# Patient Record
Sex: MALE | Race: ASIAN | URBAN - METROPOLITAN AREA
[De-identification: names, ages, dates, MRNs, and addresses within clinical notes are randomized per-mention and may not be internally consistent; named-entity substitution may affect disease eponyms.]

---

## 2017-02-16 ENCOUNTER — RADIANT APPOINTMENT (OUTPATIENT)
Dept: GENERAL RADIOLOGY | Facility: CLINIC | Age: 37
End: 2017-02-16
Attending: FAMILY MEDICINE
Payer: MEDICAID

## 2017-02-16 ENCOUNTER — OFFICE VISIT (OUTPATIENT)
Dept: FAMILY MEDICINE | Facility: CLINIC | Age: 37
End: 2017-02-16
Payer: MEDICAID

## 2017-02-16 VITALS
SYSTOLIC BLOOD PRESSURE: 120 MMHG | WEIGHT: 180 LBS | TEMPERATURE: 96.1 F | OXYGEN SATURATION: 95 % | BODY MASS INDEX: 27.78 KG/M2 | DIASTOLIC BLOOD PRESSURE: 78 MMHG | HEART RATE: 84 BPM

## 2017-02-16 DIAGNOSIS — J06.9 UPPER RESPIRATORY TRACT INFECTION, UNSPECIFIED TYPE: ICD-10-CM

## 2017-02-16 DIAGNOSIS — R06.2 WHEEZING: Primary | ICD-10-CM

## 2017-02-16 PROCEDURE — 71020 XR CHEST 2 VW: CPT

## 2017-02-16 PROCEDURE — 99214 OFFICE O/P EST MOD 30 MIN: CPT | Performed by: FAMILY MEDICINE

## 2017-02-16 RX ORDER — PREDNISONE 20 MG/1
40 TABLET ORAL DAILY
Qty: 10 TABLET | Refills: 0 | Status: SHIPPED | OUTPATIENT
Start: 2017-02-16 | End: 2017-02-21

## 2017-02-16 RX ORDER — DEXTROMETHORPHAN 30 MG/5ML
SUSPENSION, EXTENDED RELEASE ORAL
COMMUNITY
Start: 2017-02-12

## 2017-02-16 NOTE — PROGRESS NOTES
HPI:    Domo Zhu is an 36 year old male who presents for evaluation of cold symptoms.    Duration: 5-6 days  Fever: no  Cough: Productive of non bloody sputum  Shortness of breath: yes along with wheezing  History of asthma: denies  Sore throat: no  Runny nose: yes  Nasal congestion: yes  Ear pain: no  Treatment:   He was seen at [facility] on 2/12/17 and rx'd Alb. He feels better temporarily with it.    ROS:    Per HPI    SH:    Non smoker    Exam:    /78 (Cuff Size: Adult Large)  Pulse 84  Temp 96.1  F (35.6  C) (Tympanic)  Wt 180 lb (81.6 kg)  SpO2 95%  BMI 27.78 kg/m2  Gen: Healthy appearing male in no acute distress  ENT: TM's normal. Oropharynx normal. Oral mucosa moist without lesions.  Eyes: Conjunctiva and sclera normal. Pupils react normally to light. No nystagmus.  Neck: No enlarged lymph nodes, thyromegally or other masses.  Lungs: Good air movement with exp wheezing and some rhonchi. No crackles. O2 sat noted. No tachypnea or other signs of resp distress.  CV: Heart RRR with no murmurs. No JVD, carotid bruits or leg edema.      Assessment and Plan - Decision Making    1. Wheezing  See written instructions.  - XR Chest 2 Views  - predniSONE (DELTASONE) 20 MG tablet; Take 2 tablets (40 mg) by mouth daily for 5 days  Dispense: 10 tablet; Refill: 0    2. Upper respiratory tract infection, unspecified type  See written instructions.  - XR Chest 2 Views      Written instructions given as follows:    Patient Instructions   1. You xray was normal.    2. You most likely have influenza. This will resolve with time. Take over the counter medications as needed.    3. Take Prednisone as prescribed. This is a steroid anti-inflammatory medication. When used long term it can have many health consequences. But short term use is safe - common side effects are insomnia and anxiety. It should be taken 1st part of the day.    4. Come back in the next few weeks for a physical - come fasting so we do blood  tests (nothing except water for 10-12 hours).

## 2017-02-16 NOTE — PATIENT INSTRUCTIONS
1. You xray was normal.    2. You most likely have influenza. This will resolve with time. Take over the counter medications as needed.    3. Take Prednisone as prescribed. This is a steroid anti-inflammatory medication. When used long term it can have many health consequences. But short term use is safe - common side effects are insomnia and anxiety. It should be taken 1st part of the day.    4. Come back in the next few weeks for a physical - come fasting so we do blood tests (nothing except water for 10-12 hours).

## 2017-02-16 NOTE — NURSING NOTE
"Chief Complaint   Patient presents with     Cough       Initial /78 (Cuff Size: Adult Large)  Pulse 84  Temp 96.1  F (35.6  C) (Tympanic)  Wt 180 lb (81.6 kg)  SpO2 95%  BMI 27.78 kg/m2 Estimated body mass index is 27.78 kg/(m^2) as calculated from the following:    Height as of 2/26/14: 5' 7.5\" (1.715 m).    Weight as of this encounter: 180 lb (81.6 kg).  Medication Reconciliation: complete   Patient and staff were masked during visit.    Beulah Alvarado LPN    "

## 2017-02-16 NOTE — MR AVS SNAPSHOT
After Visit Summary   2/16/2017    Domo Zhu    MRN: [id number]           Patient Information     Date Of Birth          1980        Visit Information        Provider Department      2/16/2017 12:50 PM Anish Tyson MD [facility]        Today's Diagnoses     Wheezing    -  1    Upper respiratory tract infection, unspecified type          Care Instructions    1. You xray was normal.    2. You most likely have influenza. This will resolve with time. Take over the counter medications as needed.    3. Take Prednisone as prescribed. This is a steroid anti-inflammatory medication. When used long term it can have many health consequences. But short term use is safe - common side effects are insomnia and anxiety. It should be taken 1st part of the day.    4. Come back in the next few weeks for a physical - come fasting so we do blood tests (nothing except water for 10-12 hours).        Follow-ups after your visit        Who to contact     If you have questions or need follow up information about today's clinic visit or your schedule please contact [facility] directly at [phone].  Normal or non-critical lab and imaging results will be communicated to you by [employer], letter or phone within 4 business days after the clinic has received the results. If you do not hear from us within 7 days, please contact the clinic through [employer] or phone. If you have a critical or abnormal lab result, we will notify you by phone as soon as possible.  Submit refill requests through [employer] or call your pharmacy and they will forward the refill request to us. Please allow 3 business days for your refill to be completed.          Additional Information About Your Visit        MyChart Information     [employer] gives you secure access to your electronic health record. If you see a primary care provider, you can also send messages to your care team and make appointments. If you have  questions, please call your primary care clinic.  If you do not have a primary care provider, please call [phone] and they will assist you.        Care EveryWhere ID     This is your Care EveryWhere ID. This could be used by other organizations to access your [place] medical records  [id number]        Your Vitals Were     Pulse Temperature Pulse Oximetry BMI (Body Mass Index)          84 96.1  F (35.6  C) (Tympanic) 95% 27.78 kg/m2         Blood Pressure from Last 3 Encounters:   02/16/17 120/78   02/26/14 117/74    Weight from Last 3 Encounters:   02/16/17 180 lb (81.6 kg)   02/26/14 167 lb (75.8 kg)              We Performed the Following     XR Chest 2 Views          Today's Medication Changes          These changes are accurate as of: 2/16/17  1:45 PM.  If you have any questions, ask your nurse or doctor.               Start taking these medicines.        Dose/Directions    predniSONE 20 MG tablet   Commonly known as:  DELTASONE   Used for:  Wheezing   Started by:  Anish Tyson MD        Dose:  40 mg   Take 2 tablets (40 mg) by mouth daily for 5 days   Quantity:  10 tablet   Refills:  0            Where to get your medicines      These medications were sent to [facility] [address]  [id number] [place] [address]     Phone:  [phone]     predniSONE 20 MG tablet                Primary Care Provider Office Phone # Fax #    [facility] [phone] [address] [place] [address]. [facility] [address]        Thank you!     Thank you for choosing [facility]  for your care. Our goal is always to provide you with excellent care. Hearing back from our patients is one way we can continue to improve our services. Please take a few minutes to complete the written survey that you may receive in the mail after your visit with us. Thank you!             Your Updated Medication List - Protect others around you:  Learn how to safely use, store and throw away your medicines at www.disposemymeds.org.          This list is accurate as of: 2/16/17  1:45 PM.  Always use your most recent med list.                   Brand Name Dispense Instructions for use    DELSYM 30 MG/5ML liquid   Generic drug:  dextromethorphan          omeprazole 20 MG CR capsule    priLOSEC    30 capsule    Take 1 capsule (20 mg) by mouth daily       predniSONE 20 MG tablet    DELTASONE    10 tablet    Take 2 tablets (40 mg) by mouth daily for 5 days

## 2017-02-21 ENCOUNTER — TELEPHONE (OUTPATIENT)
Dept: FAMILY MEDICINE | Facility: CLINIC | Age: 37
End: 2017-02-21

## 2017-02-21 ENCOUNTER — MYC MEDICAL ADVICE (OUTPATIENT)
Dept: FAMILY MEDICINE | Facility: CLINIC | Age: 37
End: 2017-02-21

## 2017-02-21 NOTE — TELEPHONE ENCOUNTER
Patient states he missed a call from [employer], and was told to call back regarding his x-ray results.    Please call him to discuss.    Thank you.

## 2020-03-02 ENCOUNTER — HEALTH MAINTENANCE LETTER (OUTPATIENT)
Age: 40
End: 2020-03-02

## 2020-12-20 ENCOUNTER — HEALTH MAINTENANCE LETTER (OUTPATIENT)
Age: 40
End: 2020-12-20

## 2021-04-24 ENCOUNTER — HEALTH MAINTENANCE LETTER (OUTPATIENT)
Age: 41
End: 2021-04-24

## 2021-10-03 ENCOUNTER — HEALTH MAINTENANCE LETTER (OUTPATIENT)
Age: 41
End: 2021-10-03

## 2022-05-15 ENCOUNTER — HEALTH MAINTENANCE LETTER (OUTPATIENT)
Age: 42
End: 2022-05-15

## 2022-09-10 ENCOUNTER — HEALTH MAINTENANCE LETTER (OUTPATIENT)
Age: 42
End: 2022-09-10

## 2023-06-03 ENCOUNTER — HEALTH MAINTENANCE LETTER (OUTPATIENT)
Age: 43
End: 2023-06-03